# Patient Record
Sex: MALE | Race: BLACK OR AFRICAN AMERICAN | NOT HISPANIC OR LATINO | URBAN - METROPOLITAN AREA
[De-identification: names, ages, dates, MRNs, and addresses within clinical notes are randomized per-mention and may not be internally consistent; named-entity substitution may affect disease eponyms.]

---

## 2022-06-23 ENCOUNTER — INPATIENT (INPATIENT)
Facility: HOSPITAL | Age: 52
LOS: 6 days | Discharge: OTHER INSTITUTION | End: 2022-06-30
Attending: STUDENT IN AN ORGANIZED HEALTH CARE EDUCATION/TRAINING PROGRAM | Admitting: STUDENT IN AN ORGANIZED HEALTH CARE EDUCATION/TRAINING PROGRAM
Payer: MEDICAID

## 2022-06-23 VITALS
DIASTOLIC BLOOD PRESSURE: 103 MMHG | SYSTOLIC BLOOD PRESSURE: 164 MMHG | TEMPERATURE: 98 F | HEIGHT: 72 IN | OXYGEN SATURATION: 100 % | HEART RATE: 69 BPM | WEIGHT: 169.98 LBS | RESPIRATION RATE: 20 BRPM

## 2022-06-23 LAB
ALBUMIN SERPL ELPH-MCNC: 3.3 G/DL — SIGNIFICANT CHANGE UP (ref 3.3–5)
ALP SERPL-CCNC: 59 U/L — SIGNIFICANT CHANGE UP (ref 40–120)
ALT FLD-CCNC: 24 U/L — SIGNIFICANT CHANGE UP (ref 12–78)
AMPHET UR-MCNC: NEGATIVE — SIGNIFICANT CHANGE UP
ANION GAP SERPL CALC-SCNC: 1 MMOL/L — LOW (ref 5–17)
APAP SERPL-MCNC: < 2 UG/ML (ref 10–30)
APPEARANCE UR: CLEAR — SIGNIFICANT CHANGE UP
AST SERPL-CCNC: 24 U/L — SIGNIFICANT CHANGE UP (ref 15–37)
BARBITURATES UR SCN-MCNC: NEGATIVE — SIGNIFICANT CHANGE UP
BASE EXCESS BLDV CALC-SCNC: 4.3 MMOL/L — HIGH (ref -2–3)
BASOPHILS # BLD AUTO: 0.03 K/UL — SIGNIFICANT CHANGE UP (ref 0–0.2)
BASOPHILS NFR BLD AUTO: 0.6 % — SIGNIFICANT CHANGE UP (ref 0–2)
BENZODIAZ UR-MCNC: NEGATIVE — SIGNIFICANT CHANGE UP
BILIRUB SERPL-MCNC: 0.3 MG/DL — SIGNIFICANT CHANGE UP (ref 0.2–1.2)
BILIRUB UR-MCNC: NEGATIVE — SIGNIFICANT CHANGE UP
BLOOD GAS COMMENTS, VENOUS: SIGNIFICANT CHANGE UP
BUN SERPL-MCNC: 18 MG/DL — SIGNIFICANT CHANGE UP (ref 7–23)
CALCIUM SERPL-MCNC: 9.2 MG/DL — SIGNIFICANT CHANGE UP (ref 8.5–10.1)
CHLORIDE SERPL-SCNC: 114 MMOL/L — HIGH (ref 96–108)
CO2 BLDV-SCNC: 34 MMOL/L — HIGH (ref 22–26)
CO2 SERPL-SCNC: 31 MMOL/L — SIGNIFICANT CHANGE UP (ref 22–31)
COCAINE METAB.OTHER UR-MCNC: NEGATIVE — SIGNIFICANT CHANGE UP
COLOR SPEC: YELLOW — SIGNIFICANT CHANGE UP
CREAT SERPL-MCNC: 1.27 MG/DL — SIGNIFICANT CHANGE UP (ref 0.5–1.3)
DIFF PNL FLD: NEGATIVE — SIGNIFICANT CHANGE UP
EGFR: 68 ML/MIN/1.73M2 — SIGNIFICANT CHANGE UP
EOSINOPHIL # BLD AUTO: 0.33 K/UL — SIGNIFICANT CHANGE UP (ref 0–0.5)
EOSINOPHIL NFR BLD AUTO: 6.4 % — HIGH (ref 0–6)
ETHANOL SERPL-MCNC: <10 MG/DL — SIGNIFICANT CHANGE UP (ref 0–10)
FLUAV AG NPH QL: SIGNIFICANT CHANGE UP
FLUBV AG NPH QL: SIGNIFICANT CHANGE UP
GAS PNL BLDV: SIGNIFICANT CHANGE UP
GLUCOSE SERPL-MCNC: 131 MG/DL — HIGH (ref 70–99)
GLUCOSE UR QL: NEGATIVE MG/DL — SIGNIFICANT CHANGE UP
HCO3 BLDV-SCNC: 32 MMOL/L — HIGH (ref 22–28)
HCT VFR BLD CALC: 37.4 % — LOW (ref 39–50)
HGB BLD-MCNC: 12.3 G/DL — LOW (ref 13–17)
HOROWITZ INDEX BLDV+IHG-RTO: 21 — SIGNIFICANT CHANGE UP
IMM GRANULOCYTES NFR BLD AUTO: 0.4 % — SIGNIFICANT CHANGE UP (ref 0–1.5)
KETONES UR-MCNC: NEGATIVE — SIGNIFICANT CHANGE UP
LACTATE SERPL-SCNC: 0.9 MMOL/L — SIGNIFICANT CHANGE UP (ref 0.7–2)
LEUKOCYTE ESTERASE UR-ACNC: NEGATIVE — SIGNIFICANT CHANGE UP
LYMPHOCYTES # BLD AUTO: 1.61 K/UL — SIGNIFICANT CHANGE UP (ref 1–3.3)
LYMPHOCYTES # BLD AUTO: 31.2 % — SIGNIFICANT CHANGE UP (ref 13–44)
MAGNESIUM SERPL-MCNC: 2.4 MG/DL — SIGNIFICANT CHANGE UP (ref 1.6–2.6)
MCHC RBC-ENTMCNC: 26 PG — LOW (ref 27–34)
MCHC RBC-ENTMCNC: 32.9 G/DL — SIGNIFICANT CHANGE UP (ref 32–36)
MCV RBC AUTO: 79.1 FL — LOW (ref 80–100)
METHADONE UR-MCNC: NEGATIVE — SIGNIFICANT CHANGE UP
MONOCYTES # BLD AUTO: 0.37 K/UL — SIGNIFICANT CHANGE UP (ref 0–0.9)
MONOCYTES NFR BLD AUTO: 7.2 % — SIGNIFICANT CHANGE UP (ref 2–14)
NEUTROPHILS # BLD AUTO: 2.8 K/UL — SIGNIFICANT CHANGE UP (ref 1.8–7.4)
NEUTROPHILS NFR BLD AUTO: 54.2 % — SIGNIFICANT CHANGE UP (ref 43–77)
NITRITE UR-MCNC: NEGATIVE — SIGNIFICANT CHANGE UP
NRBC # BLD: 0 /100 WBCS — SIGNIFICANT CHANGE UP (ref 0–0)
OPIATES UR-MCNC: NEGATIVE — SIGNIFICANT CHANGE UP
PCO2 BLDV: 59 MMHG — HIGH (ref 42–55)
PCP SPEC-MCNC: SIGNIFICANT CHANGE UP
PCP UR-MCNC: NEGATIVE — SIGNIFICANT CHANGE UP
PH BLDV: 7.34 — SIGNIFICANT CHANGE UP (ref 7.32–7.43)
PH UR: 7 — SIGNIFICANT CHANGE UP (ref 5–8)
PLATELET # BLD AUTO: 179 K/UL — SIGNIFICANT CHANGE UP (ref 150–400)
PO2 BLDV: 29 MMHG — SIGNIFICANT CHANGE UP (ref 25–45)
POTASSIUM SERPL-MCNC: 3.9 MMOL/L — SIGNIFICANT CHANGE UP (ref 3.5–5.3)
POTASSIUM SERPL-SCNC: 3.9 MMOL/L — SIGNIFICANT CHANGE UP (ref 3.5–5.3)
PROT SERPL-MCNC: 6.9 GM/DL — SIGNIFICANT CHANGE UP (ref 6–8.3)
PROT UR-MCNC: NEGATIVE MG/DL — SIGNIFICANT CHANGE UP
RBC # BLD: 4.73 M/UL — SIGNIFICANT CHANGE UP (ref 4.2–5.8)
RBC # FLD: 13.2 % — SIGNIFICANT CHANGE UP (ref 10.3–14.5)
SALICYLATES SERPL-MCNC: <1.7 MG/DL — LOW (ref 2.8–20)
SAO2 % BLDV: 43.2 % — LOW (ref 94–98)
SARS-COV-2 RNA SPEC QL NAA+PROBE: SIGNIFICANT CHANGE UP
SODIUM SERPL-SCNC: 146 MMOL/L — HIGH (ref 135–145)
SP GR SPEC: 1.01 — SIGNIFICANT CHANGE UP (ref 1.01–1.02)
THC UR QL: NEGATIVE — SIGNIFICANT CHANGE UP
TROPONIN I, HIGH SENSITIVITY RESULT: 13.9 NG/L — SIGNIFICANT CHANGE UP
UROBILINOGEN FLD QL: NEGATIVE MG/DL — SIGNIFICANT CHANGE UP
WBC # BLD: 5.16 K/UL — SIGNIFICANT CHANGE UP (ref 3.8–10.5)
WBC # FLD AUTO: 5.16 K/UL — SIGNIFICANT CHANGE UP (ref 3.8–10.5)

## 2022-06-23 PROCEDURE — 71045 X-RAY EXAM CHEST 1 VIEW: CPT | Mod: 26

## 2022-06-23 PROCEDURE — 99222 1ST HOSP IP/OBS MODERATE 55: CPT

## 2022-06-23 PROCEDURE — 99285 EMERGENCY DEPT VISIT HI MDM: CPT

## 2022-06-23 PROCEDURE — 70450 CT HEAD/BRAIN W/O DYE: CPT | Mod: 26,MA

## 2022-06-23 PROCEDURE — 93010 ELECTROCARDIOGRAM REPORT: CPT

## 2022-06-23 RX ORDER — ENOXAPARIN SODIUM 100 MG/ML
90 INJECTION SUBCUTANEOUS EVERY 12 HOURS
Refills: 0 | Status: DISCONTINUED | OUTPATIENT
Start: 2022-06-23 | End: 2022-06-24

## 2022-06-23 RX ORDER — ENOXAPARIN SODIUM 100 MG/ML
80 INJECTION SUBCUTANEOUS EVERY 12 HOURS
Refills: 0 | Status: DISCONTINUED | OUTPATIENT
Start: 2022-06-23 | End: 2022-06-23

## 2022-06-23 RX ORDER — ENOXAPARIN SODIUM 100 MG/ML
80 INJECTION SUBCUTANEOUS ONCE
Refills: 0 | Status: DISCONTINUED | OUTPATIENT
Start: 2022-06-23 | End: 2022-06-23

## 2022-06-23 RX ORDER — PERMETHRIN CREAM 5% W/W 50 MG/G
1 CREAM TOPICAL ONCE
Refills: 0 | Status: COMPLETED | OUTPATIENT
Start: 2022-06-23 | End: 2022-06-23

## 2022-06-23 RX ADMIN — PERMETHRIN CREAM 5% W/W 1 APPLICATION(S): 50 CREAM TOPICAL at 14:45

## 2022-06-23 RX ADMIN — ENOXAPARIN SODIUM 90 MILLIGRAM(S): 100 INJECTION SUBCUTANEOUS at 22:17

## 2022-06-23 NOTE — ED ADULT NURSE NOTE - ED STAT RN HANDOFF DETAILS
Report given to Morena HENRY on 1B. Breathing unlabored on RA. patient resting comfortably in stretcher.No acute or respiratory distress noted. VS recorded in flowsheet. Endorsed all concerns to RN. IV site intact. Patient in stable condition at this time.

## 2022-06-23 NOTE — PATIENT PROFILE ADULT - FALL HARM RISK - HARM RISK INTERVENTIONS
Assistance with ambulation/Assistance OOB with selected safe patient handling equipment/Communicate Risk of Fall with Harm to all staff/Discuss with provider need for PT consult/Monitor for mental status changes/Monitor gait and stability/Move patient closer to nurses' station/Provide patient with walking aids - walker, cane, crutches/Reinforce activity limits and safety measures with patient and family/Reorient to person, place and time as needed/Tailored Fall Risk Interventions/Toileting schedule using arm’s reach rule for commode and bathroom/Use of alarms - bed, chair and/or voice tab/Visual Cue: Yellow wristband and red socks/Bed in lowest position, wheels locked, appropriate side rails in place/Call bell, personal items and telephone in reach/Instruct patient to call for assistance before getting out of bed or chair/Non-slip footwear when patient is out of bed/[place] to call system/Physically safe environment - no spills, clutter or unnecessary equipment/Purposeful Proactive Rounding/Room/bathroom lighting operational, light cord in reach

## 2022-06-23 NOTE — ED ADULT TRIAGE NOTE - DOMESTIC TRAVEL HIGH RISK QUESTION
----- Message from Constance Duran sent at 10/29/2021  4:37 PM EDT -----  Subject: Refill Request    QUESTIONS  Name of Medication? fluticasone (FLONASE) 50 MCG/ACT nasal spray  Patient-reported dosage and instructions? I spray each nostril everynight   at bedtime  How many days do you have left? 1  Preferred Pharmacy? [id number] phone number (if available)? [phone]  ---------------------------------------------------------------------------  --------------,  Name of Medication? albuterol (PROVENTIL) (2.5 MG/3ML) 0.083% nebulizer   solution  Patient-reported dosage and instructions? 1 puff with nebulizer 4 times a   day  How many days do you have left? 2  Preferred Pharmacy? [id number] phone number (if available)? [phone]  ---------------------------------------------------------------------------  --------------  CALL BACK INFO  What is the best way for the office to contact you? OK to leave message on   voicemail  Preferred Call Back Phone Number?  [phone] No

## 2022-06-23 NOTE — ED PROVIDER NOTE - WR ORDER ID 1

## 2022-06-23 NOTE — PHARMACOTHERAPY INTERVENTION NOTE - COMMENTS
Initially pt weight was 77kg; however; RN just informed that correct weight is ~94kg; recommended dose of Lovenox is 1mg/kg q12h. Spoke to the house PA and they will adjust the dose according to the new/correct weight. Spoke to Clay Jacinto - there is no DVT; however per Dr. Thao patient should be on therapeutic lovenox until CT is done which is to be done tomorrow morning. Will order 90mg q12h based on the new weight and team will make changes in the morning if negative for DVT  
Initially pt weight was 77kg; however; RN just informed that correct weight is ~94kg; recommended dose of Lovenox is 1mg/kg q12h. Spoke to the house PA and they will adjust the dose according to the new/correct weight

## 2022-06-23 NOTE — ED ADULT NURSE NOTE - OBJECTIVE STATEMENT
As per triage, patient found lying on the sidewalk in [place] sleeping. PMH HTN. Patient appears sleepy. Upon assessment, multiple bugs found on patient's skin. All clothes removed and put in a separate bag. Patient changed into hospital gown. Contact precautions sign placed on door. Attempted to awake patient, patient alert to name, , and place. Patient falling asleep while talking. States "the people that work with the hospital brought me here". denies any pain or discomfort. denies cp, SOB, difficulty breathing, abdominal pain. denies any other symptoms. denies PMH. patient unable to provide history, poor historian. placed on cardiac monitor, labs drawn. As per triage, patient found lying on the sidewalk in [place] sleeping. PMH HTN. Patient appears sleepy. right swollen leg, Upon assessment, multiple bugs found on patient's skin. All clothes removed and put in a separate bag. Patient changed into hospital gown. Contact precautions sign placed on door. Attempted to awake patient, patient alert to name, , and place. Patient falling asleep while talking. States "the people that work with the hospital brought me here". denies any pain or discomfort. denies cp, SOB, difficulty breathing, abdominal pain. denies any other symptoms. denies PMH. patient unable to provide history, poor historian. placed on cardiac monitor, labs drawn.

## 2022-06-23 NOTE — ED PROVIDER NOTE - CLINICAL SUMMARY MEDICAL DECISION MAKING FREE TEXT BOX
patient lethargic, no focal neurologic abnormalities, malodorous with poor hygiene. evaluated for causes of AMS with negative ED workup, DVT study of lower extremity ordered, unable to obtain at this time due to presence of bed bugs on patinet. will empirically give lovenox, DVT study can be followed as inpatient. no clinical signs of infection

## 2022-06-23 NOTE — ED PROVIDER NOTE - OBJECTIVE STATEMENT
51m unknown pmhx. found sleeping outside on the street, patient arousable but poor historian. denies pain any where, doesn't answer questions well. admits to feeling weak.

## 2022-06-23 NOTE — H&P ADULT - HISTORY OF PRESENT ILLNESS
51 year old male only PMH of unknown PMH was found sleeping outside on the street. Patient arousable but poor historian. Denies pain any where, doesn't answer questions well. Only admits to feeling weak. Initial labs in ER all WNL.  51 year old male only PMH of unknown PMH was found sleeping outside on the street. Patient arousable but poor historian. Denies pain any where, doesn't answer questions well. Only admits to feeling weak. Initial labs in ER all WNL. Walks independently in ER.

## 2022-06-23 NOTE — ED ADULT NURSE REASSESSMENT NOTE - NS ED NURSE REASSESS COMMENT FT1
Breathing unlabored on RA. No acute or respiratory distress noted. patient provided with meal tray. patient taken to 1B. left the unit in a stable condition. As per ROBIN Scales, patient belongings to be thrown away and SW will provide patient with resources for clothing.

## 2022-06-23 NOTE — ED ADULT NURSE REASSESSMENT NOTE - NS ED NURSE REASSESS COMMENT FT1
Patient in CT scan at this time. Patient left the unit in a stable condition. No acute or respiratory distress.

## 2022-06-23 NOTE — ED ADULT NURSE REASSESSMENT NOTE - NS ED NURSE REASSESS COMMENT FT1
Patient washed with soap and saline. Patient placed in a new gown. Premethrin topical cream applied.

## 2022-06-23 NOTE — H&P ADULT - NSHPPHYSICALEXAM_GEN_ALL_CORE
PHYSICAL EXAMINATION:  Vital Signs Last 24 Hrs  T(C): 36.9 (23 Jun 2022 12:24), Max: 36.9 (23 Jun 2022 12:24)  T(F): 98.5 (23 Jun 2022 12:24), Max: 98.5 (23 Jun 2022 12:24)  HR: 65 (23 Jun 2022 12:24) (65 - 70)  BP: 144/85 (23 Jun 2022 12:24) (144/85 - 180/98)  BP(mean): --  RR: 20 (23 Jun 2022 12:24) (14 - 20)  SpO2: 97% (23 Jun 2022 12:24) (97% - 100%)  CAPILLARY BLOOD GLUCOSE          GENERAL: NAD,   ENMT: mucous membranes moist  NECK: supple, No JVD  CHEST/LUNG: clear to auscultation bilaterally; no rales, rhonchi, or wheezing b/l  HEART: normal S1, S2  ABDOMEN: BS+, soft, ND, NT   EXTREMITIES:  pulses palpable; no clubbing, cyanosis, or edema b/l LEs  NEURO: awake, alert, interactive; moves all extremities PHYSICAL EXAMINATION:  Vital Signs Last 24 Hrs  T(C): 36.9 (23 Jun 2022 12:24), Max: 36.9 (23 Jun 2022 12:24)  T(F): 98.5 (23 Jun 2022 12:24), Max: 98.5 (23 Jun 2022 12:24)  HR: 65 (23 Jun 2022 12:24) (65 - 70)  BP: 144/85 (23 Jun 2022 12:24) (144/85 - 180/98)  BP(mean): --  RR: 20 (23 Jun 2022 12:24) (14 - 20)  SpO2: 97% (23 Jun 2022 12:24) (97% - 100%)  CAPILLARY BLOOD GLUCOSE          GENERAL: NAD, seen in ER,comfortable, no fevers or SOB, mild right leg edema.   ENMT: mucous membranes moist  NECK: supple, No JVD  CHEST/LUNG: clear to auscultation bilaterally; no rales, rhonchi, or wheezing b/l  HEART: normal S1, S2  ABDOMEN: BS+, soft, ND, NT   EXTREMITIES:  pulses palpable; no clubbing, cyanosis, 1 plus edema right leg.   NEURO: awake, alert, interactive; moves all extremities

## 2022-06-23 NOTE — ED PROVIDER NOTE - PHYSICAL EXAMINATION
General: arousable, nontoxic appearing  Skin: Skin in warm, dry  HENMT: head normocephalic and atraumatic; bilateral external ears without swelling. no nasal discharge. moist oral mucosa. supple neck, trachea midline  EYES: Conjunctiva clear. nonicteric sclera. EOM intact, Eyelids are normal in appearance without swelling or lesions.  Cardiac: well perfused, s1, s2, rrr  Respiratory: breathing comfortably on room air. no audible wheezing or stridor  Abdominal: nondistended, soft, nontender  MSK: Neck and back are without deformity, visible external skin changes, or signs of trauma. Curvature of the cervical, thoracic, and lumbar spine are within normal limits. no external signs of trauma. no apparent deficits in ROM of any extremity. swelling to RLE with scabbed lesions  Neurological: patient is lethargic but arousable to verbal stimulation, following commands, no obvious defecits, moving all 4 extremities

## 2022-06-23 NOTE — H&P ADULT - NSHPLABSRESULTS_GEN_ALL_CORE
LABS:                        12.3   5.16  )-----------( 179      ( 2022 12:10 )             37.4         146<H>  |  114<H>  |  18  ----------------------------<  131<H>  3.9   |  31  |  1.27    Ca    9.2      2022 12:10  Mg     2.4         TPro  6.9  /  Alb  3.3  /  TBili  0.3  /  DBili  x   /  AST  24  /  ALT  24  /  AlkPhos  59        Urinalysis Basic - ( 2022 17:05 )    Color: Yellow / Appearance: Clear / S.010 / pH: x  Gluc: x / Ketone: Negative  / Bili: Negative / Urobili: Negative mg/dL   Blood: x / Protein: Negative mg/dL / Nitrite: Negative   Leuk Esterase: Negative / RBC: x / WBC x   Sq Epi: x / Non Sq Epi: x / Bacteria: x          RADIOLOGY & ADDITIONAL TESTS:

## 2022-06-23 NOTE — ED ADULT NURSE NOTE - CHIEF COMPLAINT QUOTE
General weakness, right swollen leg, found lying on the sidewalk in [place] sleeping  H/O HTN

## 2022-06-23 NOTE — H&P ADULT - ASSESSMENT
51 year old male only PMH of unknown PMH was found sleeping outside on the street. Patient arousable but poor historian. Denies pain any where, doesn't answer questions well. Only admits to feeling weak. Initial labs in ER all WNL.      Plan:   51 year old male only PMH of unknown PMH was found sleeping outside on the street. Patient arousable but poor historian. Denies pain any where, doesn't answer questions well. Only admits to feeling weak. Initial labs in ER all WNL.        Plan:  Admit to medicine. Social work consult requested. Reports no PMH or regular meds. Bed bugs noted in ER. Was washed and bathed and bed bugs   were treated in ER twice.  Will do LE sonogram in AM r/o right leg DVT. On Lovenox until test is done. No CP or SOB.   No DVT risk factors.    51 year old male only PMH of unknown PMH was found sleeping outside on the street. Patient arousable but poor historian. Denies pain any where, doesn't answer questions well. Only admits to feeling weak. Initial labs in ER all WNL.        Plan:  Admit to medicine. Social work consult requested. Reports no PMH or regular meds. Bed bugs noted in ER. Was washed and bathed and bed bugs   were treated in ER twice.  Will do LE sonogram in AM r/o right leg DVT. On Lovenox until test is done. No CP or SOB.   No DVT risk factors.    UA negative for infection, CXR was clear, CT head no acute findings.  Urine tox negative.

## 2022-06-24 LAB
CULTURE RESULTS: SIGNIFICANT CHANGE UP
HCT VFR BLD CALC: 36.7 % — LOW (ref 39–50)
HGB BLD-MCNC: 12 G/DL — LOW (ref 13–17)
MCHC RBC-ENTMCNC: 25.8 PG — LOW (ref 27–34)
MCHC RBC-ENTMCNC: 32.7 G/DL — SIGNIFICANT CHANGE UP (ref 32–36)
MCV RBC AUTO: 78.8 FL — LOW (ref 80–100)
NRBC # BLD: 0 /100 WBCS — SIGNIFICANT CHANGE UP (ref 0–0)
PLATELET # BLD AUTO: 179 K/UL — SIGNIFICANT CHANGE UP (ref 150–400)
RBC # BLD: 4.66 M/UL — SIGNIFICANT CHANGE UP (ref 4.2–5.8)
RBC # FLD: 13.3 % — SIGNIFICANT CHANGE UP (ref 10.3–14.5)
SPECIMEN SOURCE: SIGNIFICANT CHANGE UP
WBC # BLD: 4.52 K/UL — SIGNIFICANT CHANGE UP (ref 3.8–10.5)
WBC # FLD AUTO: 4.52 K/UL — SIGNIFICANT CHANGE UP (ref 3.8–10.5)

## 2022-06-24 PROCEDURE — 99231 SBSQ HOSP IP/OBS SF/LOW 25: CPT

## 2022-06-24 PROCEDURE — 93971 EXTREMITY STUDY: CPT | Mod: 26,RT

## 2022-06-24 RX ORDER — ENOXAPARIN SODIUM 100 MG/ML
40 INJECTION SUBCUTANEOUS EVERY 24 HOURS
Refills: 0 | Status: DISCONTINUED | OUTPATIENT
Start: 2022-06-25 | End: 2022-06-30

## 2022-06-24 RX ADMIN — ENOXAPARIN SODIUM 90 MILLIGRAM(S): 100 INJECTION SUBCUTANEOUS at 05:06

## 2022-06-24 NOTE — PROGRESS NOTE ADULT - ASSESSMENT
51 year old male only PMH of unknown PMH was found sleeping outside on the street. Patient arousable but poor historian. Denies pain any where, doesn't answer questions well. Only admits to feeling weak. Initial labs in ER all WNL.        Placement issue:  Social work consult requested. Reports no PMH or regular meds. Bed bugs noted in ER. Was washed and bathed and bed bugs   were treated in ER twice.    DVT ruled out, AC switched to ppx dose.    UA negative for infection, CXR was clear, CT head no acute findings.  Urine tox negative.

## 2022-06-25 LAB
ANION GAP SERPL CALC-SCNC: 5 MMOL/L — SIGNIFICANT CHANGE UP (ref 5–17)
BUN SERPL-MCNC: 13 MG/DL — SIGNIFICANT CHANGE UP (ref 7–23)
CALCIUM SERPL-MCNC: 8.6 MG/DL — SIGNIFICANT CHANGE UP (ref 8.5–10.1)
CHLORIDE SERPL-SCNC: 109 MMOL/L — HIGH (ref 96–108)
CO2 SERPL-SCNC: 30 MMOL/L — SIGNIFICANT CHANGE UP (ref 22–31)
CREAT SERPL-MCNC: 1.28 MG/DL — SIGNIFICANT CHANGE UP (ref 0.5–1.3)
EGFR: 67 ML/MIN/1.73M2 — SIGNIFICANT CHANGE UP
GLUCOSE SERPL-MCNC: 117 MG/DL — HIGH (ref 70–99)
HCT VFR BLD CALC: 36.6 % — LOW (ref 39–50)
HGB BLD-MCNC: 12.2 G/DL — LOW (ref 13–17)
MCHC RBC-ENTMCNC: 26.2 PG — LOW (ref 27–34)
MCHC RBC-ENTMCNC: 33.3 G/DL — SIGNIFICANT CHANGE UP (ref 32–36)
MCV RBC AUTO: 78.7 FL — LOW (ref 80–100)
NRBC # BLD: 0 /100 WBCS — SIGNIFICANT CHANGE UP (ref 0–0)
PLATELET # BLD AUTO: 172 K/UL — SIGNIFICANT CHANGE UP (ref 150–400)
POTASSIUM SERPL-MCNC: 3.8 MMOL/L — SIGNIFICANT CHANGE UP (ref 3.5–5.3)
POTASSIUM SERPL-SCNC: 3.8 MMOL/L — SIGNIFICANT CHANGE UP (ref 3.5–5.3)
RBC # BLD: 4.65 M/UL — SIGNIFICANT CHANGE UP (ref 4.2–5.8)
RBC # FLD: 13.3 % — SIGNIFICANT CHANGE UP (ref 10.3–14.5)
SODIUM SERPL-SCNC: 144 MMOL/L — SIGNIFICANT CHANGE UP (ref 135–145)
WBC # BLD: 4.01 K/UL — SIGNIFICANT CHANGE UP (ref 3.8–10.5)
WBC # FLD AUTO: 4.01 K/UL — SIGNIFICANT CHANGE UP (ref 3.8–10.5)

## 2022-06-25 PROCEDURE — 99232 SBSQ HOSP IP/OBS MODERATE 35: CPT

## 2022-06-25 RX ORDER — AMLODIPINE BESYLATE 2.5 MG/1
5 TABLET ORAL DAILY
Refills: 0 | Status: DISCONTINUED | OUTPATIENT
Start: 2022-06-25 | End: 2022-06-25

## 2022-06-25 RX ORDER — HYDRALAZINE HCL 50 MG
10 TABLET ORAL ONCE
Refills: 0 | Status: COMPLETED | OUTPATIENT
Start: 2022-06-25 | End: 2022-06-25

## 2022-06-25 RX ORDER — AMLODIPINE BESYLATE 2.5 MG/1
10 TABLET ORAL DAILY
Refills: 0 | Status: DISCONTINUED | OUTPATIENT
Start: 2022-06-25 | End: 2022-06-30

## 2022-06-25 RX ADMIN — ENOXAPARIN SODIUM 40 MILLIGRAM(S): 100 INJECTION SUBCUTANEOUS at 05:11

## 2022-06-25 RX ADMIN — Medication 10 MILLIGRAM(S): at 18:51

## 2022-06-25 RX ADMIN — AMLODIPINE BESYLATE 5 MILLIGRAM(S): 2.5 TABLET ORAL at 14:29

## 2022-06-25 NOTE — DIETITIAN INITIAL EVALUATION ADULT - OTHER INFO
Pt undomiciled, found on street; admitted with AMS. Pt independent for ADLs.  Pt appears confused; unable to answer questions effectively; unable to obtain information regarding pt's diet & wt hx, po intake PTA, etc.  Pt keeps repeating that he knows how to cook and prepares his own foods, however could not provide information about what foods/meals he cooks, where he prepares/cooks meals or information about purchasing/accessing foods. Pending SW and CM consults. Plan is for shelter placement.  Pt with good appetite & po intake during admission, >75% of meals consumed; no reports of chewing/swallowing difficulty.

## 2022-06-25 NOTE — DIETITIAN INITIAL EVALUATION ADULT - PERTINENT MEDS FT
MEDICATIONS  (STANDING):  enoxaparin Injectable 40 milliGRAM(s) SubCutaneous every 24 hours    MEDICATIONS  (PRN):

## 2022-06-25 NOTE — DIETITIAN INITIAL EVALUATION ADULT - ETIOLOGY
Pt undomiciled; lack of financial resources to purchase foods, lack of participation in community supplemental food programs

## 2022-06-25 NOTE — DIETITIAN INITIAL EVALUATION ADULT - PERTINENT LABORATORY DATA
06-25    144  |  109<H>  |  13  ----------------------------<  117<H>  3.8   |  30  |  1.28    Ca    8.6      25 Jun 2022 10:45

## 2022-06-25 NOTE — DIETITIAN INITIAL EVALUATION ADULT - REASON
Pt politely refused nutrition focused physical exam; pt with confusion and unclear whether pt understood; pt with no visible signs of muscle wasting or fat depletion in visible regions (temporal, orbital, buccal regions).

## 2022-06-25 NOTE — PROGRESS NOTE ADULT - ASSESSMENT
51 year old male denies PMH presented after being found asleep, noted to have uncontrolled hypertension.    # Uncontrolled Hypertension - BP control improving.  Escalate Norvasc.   # ? Altered Mental Status - pt denies, states he was sleeping.  CT Head unremarkable.  Supportive care.  # Inpatient DVT Prophylaxis - Lovenox subcut      Stable for d/c pending SW followup.

## 2022-06-26 LAB
ANION GAP SERPL CALC-SCNC: 8 MMOL/L — SIGNIFICANT CHANGE UP (ref 5–17)
BUN SERPL-MCNC: 13 MG/DL — SIGNIFICANT CHANGE UP (ref 7–23)
CALCIUM SERPL-MCNC: 9.1 MG/DL — SIGNIFICANT CHANGE UP (ref 8.5–10.1)
CHLORIDE SERPL-SCNC: 112 MMOL/L — HIGH (ref 96–108)
CO2 SERPL-SCNC: 25 MMOL/L — SIGNIFICANT CHANGE UP (ref 22–31)
CREAT SERPL-MCNC: 1.11 MG/DL — SIGNIFICANT CHANGE UP (ref 0.5–1.3)
EGFR: 79 ML/MIN/1.73M2 — SIGNIFICANT CHANGE UP
GLUCOSE SERPL-MCNC: 91 MG/DL — SIGNIFICANT CHANGE UP (ref 70–99)
POTASSIUM SERPL-MCNC: 3.6 MMOL/L — SIGNIFICANT CHANGE UP (ref 3.5–5.3)
POTASSIUM SERPL-SCNC: 3.6 MMOL/L — SIGNIFICANT CHANGE UP (ref 3.5–5.3)
SODIUM SERPL-SCNC: 145 MMOL/L — SIGNIFICANT CHANGE UP (ref 135–145)

## 2022-06-26 PROCEDURE — 99232 SBSQ HOSP IP/OBS MODERATE 35: CPT

## 2022-06-26 RX ORDER — LISINOPRIL 2.5 MG/1
5 TABLET ORAL DAILY
Refills: 0 | Status: DISCONTINUED | OUTPATIENT
Start: 2022-06-26 | End: 2022-06-30

## 2022-06-26 RX ADMIN — ENOXAPARIN SODIUM 40 MILLIGRAM(S): 100 INJECTION SUBCUTANEOUS at 05:10

## 2022-06-26 RX ADMIN — AMLODIPINE BESYLATE 10 MILLIGRAM(S): 2.5 TABLET ORAL at 05:10

## 2022-06-26 RX ADMIN — LISINOPRIL 5 MILLIGRAM(S): 2.5 TABLET ORAL at 16:53

## 2022-06-26 NOTE — PROGRESS NOTE ADULT - ASSESSMENT
51 year old male denies PMH presented after being found asleep, noted to have uncontrolled hypertension, ? Altered mental status.    # Uncontrolled Hypertension - BP control improving.  Norvasc.  Add ACEI.    # Altered Mental Status - pt denies, states he was sleeping.  CT Head unremarkable.  Supportive care.  # Inpatient DVT Prophylaxis - Lovenox subcut      Stable for d/c pending SW followup.   51 year old male denies PMH presented after being found asleep, noted to have uncontrolled hypertension, ? Altered mental status.    # Uncontrolled Hypertension - BP control improving.  Norvasc.  Add ACEI.    # Altered Mental Status - pt denies, states he was sleeping.  CT Head unremarkable.  Supportive care.  # Inpatient DVT Prophylaxis - Lovenox subcut    Stable for d/c pending shelter placement.  I discussed with GENEVA

## 2022-06-27 PROCEDURE — 99232 SBSQ HOSP IP/OBS MODERATE 35: CPT

## 2022-06-27 RX ORDER — AMLODIPINE BESYLATE 2.5 MG/1
1 TABLET ORAL
Qty: 30 | Refills: 0
Start: 2022-06-27

## 2022-06-27 RX ORDER — LISINOPRIL 2.5 MG/1
1 TABLET ORAL
Qty: 30 | Refills: 0
Start: 2022-06-27

## 2022-06-27 RX ADMIN — LISINOPRIL 5 MILLIGRAM(S): 2.5 TABLET ORAL at 05:15

## 2022-06-27 RX ADMIN — ENOXAPARIN SODIUM 40 MILLIGRAM(S): 100 INJECTION SUBCUTANEOUS at 05:16

## 2022-06-27 RX ADMIN — AMLODIPINE BESYLATE 10 MILLIGRAM(S): 2.5 TABLET ORAL at 05:15

## 2022-06-27 NOTE — DISCHARGE NOTE PROVIDER - NSDCCPCAREPLAN_GEN_ALL_CORE_FT
PRINCIPAL DISCHARGE DIAGNOSIS  Diagnosis: AMS (altered mental status)  Assessment and Plan of Treatment:       SECONDARY DISCHARGE DIAGNOSES  Diagnosis: Essential hypertension  Assessment and Plan of Treatment:     Diagnosis: Homeless  Assessment and Plan of Treatment:      PRINCIPAL DISCHARGE DIAGNOSIS  Diagnosis: Metabolic encephalopathy  Assessment and Plan of Treatment:       SECONDARY DISCHARGE DIAGNOSES  Diagnosis: Essential hypertension  Assessment and Plan of Treatment:     Diagnosis: Homeless  Assessment and Plan of Treatment:     Diagnosis: Hypernatremia  Assessment and Plan of Treatment:

## 2022-06-27 NOTE — DISCHARGE NOTE PROVIDER - NSDCFUADDINST_GEN_ALL_CORE_FT
Ensure Enlive 1 can oral 3 times per day.     It is important to see your primary physician as well as any specialty physicians within the next week to perform a comprehensive medical review.  Call their offices for an appointment as soon as you leave the hospital.  You will also need to see them for renewal of your medications.  If have any difficulty following with a physician, contact the [facility] [phone] or via [email or web address].   To obtain your results, you can access the [email or web address] Patient Portal at [email or web address].  Your medical issues appear to be stable at this time, but if your symptoms recur or worsen, contact your physicians and/or return to the hospital if necessary.  If you encounter any issues or questions with your medication, call your physicians before stopping the medication.  Do not drive.  Limit your diet to 2 grams of sodium daily.

## 2022-06-27 NOTE — DISCHARGE NOTE PROVIDER - HOSPITAL COURSE
51 year old male denies PMH presented after being found asleep, noted to have uncontrolled hypertension, ? Altered mental status.    # Uncontrolled Hypertension - BP control improving.  Norvasc.  Continue ACEI.    # Altered Mental Status - pt denies, states he was sleeping.  CT Head unremarkable.  Supportive care.  # Inpatient DVT Prophylaxis - Lovenox subcut    Stable for d/c pending shelter placement.  I discussed with GENEVA     51 year old male denies PMH presented after being found confused, noted on admission to be hypernatremic with uncontrolled hypertension with bedbug infestation.  Bathed in ED, Na, BP have improved.  Pt awaiting shelter placement. 51 year old male denies PMH presented after being found confused, noted on admission to be hypernatremic with uncontrolled hypertension with bedbug infestation.  Bathed in ED, Na, BP have improved.  Pt awaiting shelter placement.  Recieved 3rd COVID vaccination. Patient medically stable for discharge.     # Uncontrolled Hypertension - BP control improving.  Norvasc.  Continue ACEI.   # Hypernatremia - resolved.  Pt taking po intake.   # Metabolic Encephalopathy - likely due to above.  Resolved.  CT Head unremarkable.  Supportive care.  # Inpatient DVT Prophylaxis - Lovenox subcut

## 2022-06-27 NOTE — PROGRESS NOTE ADULT - ASSESSMENT
51 year old male denies PMH presented after being found asleep, noted to have uncontrolled hypertension, ? Altered mental status.    # Uncontrolled Hypertension - BP control improving.  Norvasc.  Continue ACEI.    # Altered Mental Status - pt denies, states he was sleeping.  CT Head unremarkable.  Supportive care.  # Inpatient DVT Prophylaxis - Lovenox subcut    Stable for d/c pending shelter placement.  I discussed with GENEVA

## 2022-06-28 LAB
ANION GAP SERPL CALC-SCNC: 3 MMOL/L — LOW (ref 5–17)
BUN SERPL-MCNC: 16 MG/DL — SIGNIFICANT CHANGE UP (ref 7–23)
CALCIUM SERPL-MCNC: 8.8 MG/DL — SIGNIFICANT CHANGE UP (ref 8.5–10.1)
CHLORIDE SERPL-SCNC: 109 MMOL/L — HIGH (ref 96–108)
CO2 SERPL-SCNC: 30 MMOL/L — SIGNIFICANT CHANGE UP (ref 22–31)
CREAT SERPL-MCNC: 1.13 MG/DL — SIGNIFICANT CHANGE UP (ref 0.5–1.3)
EGFR: 78 ML/MIN/1.73M2 — SIGNIFICANT CHANGE UP
GLUCOSE SERPL-MCNC: 87 MG/DL — SIGNIFICANT CHANGE UP (ref 70–99)
HCT VFR BLD CALC: 37.2 % — LOW (ref 39–50)
HGB BLD-MCNC: 12.3 G/DL — LOW (ref 13–17)
MCHC RBC-ENTMCNC: 25.8 PG — LOW (ref 27–34)
MCHC RBC-ENTMCNC: 33.1 G/DL — SIGNIFICANT CHANGE UP (ref 32–36)
MCV RBC AUTO: 78.2 FL — LOW (ref 80–100)
NRBC # BLD: 0 /100 WBCS — SIGNIFICANT CHANGE UP (ref 0–0)
PLATELET # BLD AUTO: 183 K/UL — SIGNIFICANT CHANGE UP (ref 150–400)
POTASSIUM SERPL-MCNC: 3.7 MMOL/L — SIGNIFICANT CHANGE UP (ref 3.5–5.3)
POTASSIUM SERPL-SCNC: 3.7 MMOL/L — SIGNIFICANT CHANGE UP (ref 3.5–5.3)
RAPID RVP RESULT: SIGNIFICANT CHANGE UP
RBC # BLD: 4.76 M/UL — SIGNIFICANT CHANGE UP (ref 4.2–5.8)
RBC # FLD: 13.2 % — SIGNIFICANT CHANGE UP (ref 10.3–14.5)
SARS-COV-2 RNA SPEC QL NAA+PROBE: SIGNIFICANT CHANGE UP
SODIUM SERPL-SCNC: 142 MMOL/L — SIGNIFICANT CHANGE UP (ref 135–145)
WBC # BLD: 4.44 K/UL — SIGNIFICANT CHANGE UP (ref 3.8–10.5)
WBC # FLD AUTO: 4.44 K/UL — SIGNIFICANT CHANGE UP (ref 3.8–10.5)

## 2022-06-28 PROCEDURE — 99232 SBSQ HOSP IP/OBS MODERATE 35: CPT

## 2022-06-28 RX ADMIN — LISINOPRIL 5 MILLIGRAM(S): 2.5 TABLET ORAL at 05:43

## 2022-06-28 RX ADMIN — ENOXAPARIN SODIUM 40 MILLIGRAM(S): 100 INJECTION SUBCUTANEOUS at 05:43

## 2022-06-28 RX ADMIN — AMLODIPINE BESYLATE 10 MILLIGRAM(S): 2.5 TABLET ORAL at 05:43

## 2022-06-28 NOTE — PROGRESS NOTE ADULT - ASSESSMENT
51 year old male denies PMH presented after being found confused, noted on admission to be hypernatremic with uncontrolled hypertension with bedbug infestation.  Bathed in ED, Na, BP have improved.  Pt awaiting shelter placement.  Requesting 3rd COVID vaccination.    # Uncontrolled Hypertension - BP control improving.  Norvasc.  Continue ACEI.    # Hypernatremia - resolved.  Pt taking po intake.   # Metabolic Encephalopathy - likely due to above.  Resolved.  CT Head unremarkable.  Supportive care.  # Inpatient DVT Prophylaxis - Lovenox subcut    Stable for d/c pending shelter placement.  I discussed with GENEVA

## 2022-06-28 NOTE — CHART NOTE - NSCHARTNOTEFT_GEN_A_CORE
Medicine Hospitalist PA    Covid Screening Consent form discussed with patient. Patient denies any covid infection. Pt is vaccinated x 2 doses. Pt agrees to Pfizer vaccine booster inhouse prior to discharge. Pfizer booster to be ordered and covid screen form signed by pt and placed in chart.

## 2022-06-29 PROCEDURE — 99232 SBSQ HOSP IP/OBS MODERATE 35: CPT

## 2022-06-29 RX ADMIN — LISINOPRIL 5 MILLIGRAM(S): 2.5 TABLET ORAL at 05:50

## 2022-06-29 RX ADMIN — ENOXAPARIN SODIUM 40 MILLIGRAM(S): 100 INJECTION SUBCUTANEOUS at 05:50

## 2022-06-29 RX ADMIN — AMLODIPINE BESYLATE 10 MILLIGRAM(S): 2.5 TABLET ORAL at 05:50

## 2022-06-29 NOTE — PROGRESS NOTE ADULT - ASSESSMENT
51 year old male denies PMH presented after being found confused, noted on admission to be hypernatremic with uncontrolled hypertension with bedbug infestation.  Bathed in ED, Na, BP have improved.  Pt awaiting shelter placement.  Recieved 3rd COVID vaccination.    # Uncontrolled Hypertension - BP control improving.  Norvasc.  Continue ACEI.   # Hypernatremia - resolved.  Pt taking po intake.   # Metabolic Encephalopathy - likely due to above.  Resolved.  CT Head unremarkable.  Supportive care.  # Inpatient DVT Prophylaxis - Lovenox subcut

## 2022-06-29 NOTE — PROGRESS NOTE ADULT - REASON FOR ADMISSION
Homeless, found on street.

## 2022-06-29 NOTE — PROGRESS NOTE ADULT - SUBJECTIVE AND OBJECTIVE BOX
Patient: EMIR KHAN [id number] 51y Male                            Hospitalist Attending Note    No complaints.   States he was vaccinated against COVID > 6 months ago, never received booster.      ____________________PHYSICAL EXAM:  GENERAL:  NAD Alert and Oriented x 3   HEENT: NCAT  CARDIOVASCULAR:  S1, S2  LUNGS: CTAB  ABDOMEN:  soft, (-) tenderness, (-) distension, (+) bowel sounds, (-) guarding, (-) rebound (-) rigidity  EXTREMITIES:  no cyanosis / clubbing / edema.   ____________________    VITALS:  Vital Signs Last 24 Hrs  T(C): 36.6 (28 Jun 2022 10:45), Max: 36.9 (27 Jun 2022 11:41)  T(F): 97.9 (28 Jun 2022 10:45), Max: 98.5 (27 Jun 2022 11:41)  HR: 67 (28 Jun 2022 10:45) (55 - 90)  BP: 148/82 (28 Jun 2022 10:45) (130/77 - 157/91)  BP(mean): --  RR: 18 (28 Jun 2022 10:45) (18 - 20)  SpO2: 100% (28 Jun 2022 10:45) (94% - 100%) Daily     Daily   CAPILLARY BLOOD GLUCOSE        I&O's Summary    27 Jun 2022 07:01  -  28 Jun 2022 07:00  --------------------------------------------------------  IN: 0 mL / OUT: 200 mL / NET: -200 mL    28 Jun 2022 07:01  -  28 Jun 2022 10:56  --------------------------------------------------------  IN: 380 mL / OUT: 0 mL / NET: 380 mL        LABS:                        12.3   4.44  )-----------( 183      ( 28 Jun 2022 06:49 )             37.2     06-28    142  |  109<H>  |  16  ----------------------------<  87  3.7   |  30  |  1.13    Ca    8.8      28 Jun 2022 06:49                    MEDICATIONS:  amLODIPine   Tablet 10 milliGRAM(s) Oral daily  enoxaparin Injectable 40 milliGRAM(s) SubCutaneous every 24 hours  lisinopril 5 milliGRAM(s) Oral daily    
                          Patient: EMIR KHAN [id number] 51y Male                            Hospitalist Attending Note    No complaints.     ____________________PHYSICAL EXAM:  GENERAL:  NAD Alert and Oriented x 3   HEENT: NCAT  CARDIOVASCULAR:  S1, S2  LUNGS: CTAB  ABDOMEN:  soft, (-) tenderness, (-) distension, (+) bowel sounds, (-) guarding, (-) rebound (-) rigidity  EXTREMITIES:  no cyanosis / clubbing / edema.   ____________________       VITALS:  Vital Signs Last 24 Hrs  T(C): 36.8 (25 Jun 2022 17:26), Max: 36.8 (25 Jun 2022 17:26)  T(F): 98.2 (25 Jun 2022 17:26), Max: 98.2 (25 Jun 2022 17:26)  HR: 61 (25 Jun 2022 17:26) (58 - 77)  BP: 163/100 (25 Jun 2022 17:26) (162/88 - 191/107)  BP(mean): --  RR: 18 (25 Jun 2022 17:26) (18 - 19)  SpO2: 100% (25 Jun 2022 17:26) (97% - 100%) Daily     Daily   CAPILLARY BLOOD GLUCOSE        I&O's Summary      HISTORY:  PAST MEDICAL & SURGICAL HISTORY:  HTN (hypertension)      Allergies    No Known Allergies    Intolerances       LABS:                        12.2   4.01  )-----------( 172      ( 25 Jun 2022 10:45 )             36.6       Culture - Urine (collected 23 Jun 2022 17:05)  Source: Clean Catch Clean Catch (Midstream)  Final Report (24 Jun 2022 22:53):    <10,000 CFU/mL Normal Urogenital Natalee    06-25    144  |  109<H>  |  13  ----------------------------<  117<H>  3.8   |  30  |  1.28    Ca    8.6      25 Jun 2022 10:45                Culture - Urine (collected 23 Jun 2022 17:05)  Source: Clean Catch Clean Catch (Midstream)  Final Report (24 Jun 2022 22:53):    <10,000 CFU/mL Normal Urogenital Natalee          MEDICATIONS:  MEDICATIONS  (STANDING):  amLODIPine   Tablet 5 milliGRAM(s) Oral daily  enoxaparin Injectable 40 milliGRAM(s) SubCutaneous every 24 hours    MEDICATIONS  (PRN):  
Patient is a 51y old  Male who presents with a chief complaint of Homeless, found on street. (2022 18:15)      INTERVAL HPI/OVERNIGHT EVENTS:  Pt was seen and examined, no acute  events.      MEDICATIONS  (STANDING):  enoxaparin Injectable 40 milliGRAM(s) SubCutaneous every 24 hours    MEDICATIONS  (PRN):      Allergies  No Known Allergies        Vital Signs Last 24 Hrs  T(C): 36.7 (2022 17:28), Max: 36.9 (2022 00:31)  T(F): 98.1 (2022 17:28), Max: 98.4 (2022 00:31)  HR: 77 (2022 17:28) (66 - 78)  BP: 164/99 (2022 17:28) (138/85 - 166/78)  BP(mean): --  RR: 18 (2022 17:28) (18 - 18)  SpO2: 96% (2022 17:28) (96% - 97%)      PHYSICAL EXAM:  GENERAL: NAD  HEAD:  Atraumatic  EYES: PERRLA  NERVOUS SYSTEM:  Awake, alert  CHEST/LUNG: Clear  HEART: RRR, S1, S2  ABDOMEN: Soft, non tender  EXTREMITIES:  no edema      LABS:                        12.0   4.52  )-----------( 179      ( 2022 05:34 )             36.7     06-23    146<H>  |  114<H>  |  18  ----------------------------<  131<H>  3.9   |  31  |  1.27    Ca    9.2      2022 12:10  Mg     2.4     -    TPro  6.9  /  Alb  3.3  /  TBili  0.3  /  DBili  x   /  AST  24  /  ALT  24  /  AlkPhos  59  06-23      Urinalysis Basic - ( 2022 17:05 )    Color: Yellow / Appearance: Clear / S.010 / pH: x  Gluc: x / Ketone: Negative  / Bili: Negative / Urobili: Negative mg/dL   Blood: x / Protein: Negative mg/dL / Nitrite: Negative   Leuk Esterase: Negative / RBC: x / WBC x   Sq Epi: x / Non Sq Epi: x / Bacteria: x      CAPILLARY BLOOD GLUCOSE          RADIOLOGY & ADDITIONAL TESTS:    Imaging Personally Reviewed:  [ ] YES  [ ] NO    Consultant(s) Notes Reviewed:  [ ] YES  [ ] NO    Care Discussed with Consultants/Other Providers [ ] YES  [ ] NO
                          Patient: EMIR KHAN [id number] 51y Male                            Hospitalist Attending Note    No complaints.     ____________________PHYSICAL EXAM:  GENERAL:  NAD Alert and Oriented x 3   HEENT: NCAT  CARDIOVASCULAR:  S1, S2  LUNGS: CTAB  ABDOMEN:  soft, (-) tenderness, (-) distension, (+) bowel sounds, (-) guarding, (-) rebound (-) rigidity  EXTREMITIES:  no cyanosis / clubbing / edema.   ____________________    VITALS:  Vital Signs Last 24 Hrs  T(C): 36.9 (27 Jun 2022 11:41), Max: 37 (26 Jun 2022 17:14)  T(F): 98.5 (27 Jun 2022 11:41), Max: 98.6 (26 Jun 2022 17:14)  HR: 78 (27 Jun 2022 11:41) (59 - 78)  BP: 130/77 (27 Jun 2022 11:41) (130/77 - 155/83)  BP(mean): --  RR: 18 (27 Jun 2022 11:41) (18 - 18)  SpO2: 97% (27 Jun 2022 11:41) (95% - 100%) Daily     Daily   CAPILLARY BLOOD GLUCOSE        I&O's Summary      LABS:    06-26    145  |  112<H>  |  13  ----------------------------<  91  3.6   |  25  |  1.11    Ca    9.1      26 Jun 2022 09:59                    MEDICATIONS:  amLODIPine   Tablet 10 milliGRAM(s) Oral daily  enoxaparin Injectable 40 milliGRAM(s) SubCutaneous every 24 hours  lisinopril 5 milliGRAM(s) Oral daily    
                          Patient: EMIR KHAN [id number] 51y Male                            Hospitalist Attending Note    No complaints.     ____________________PHYSICAL EXAM:  GENERAL:  NAD Alert and Oriented x 3   HEENT: NCAT  CARDIOVASCULAR:  S1, S2  LUNGS: CTAB  ABDOMEN:  soft, (-) tenderness, (-) distension, (+) bowel sounds, (-) guarding, (-) rebound (-) rigidity  EXTREMITIES:  no cyanosis / clubbing / edema.   ____________________    VITALS:  Vital Signs Last 24 Hrs  T(C): 36.7 (26 Jun 2022 11:37), Max: 37.1 (25 Jun 2022 23:48)  T(F): 98 (26 Jun 2022 11:37), Max: 98.8 (25 Jun 2022 23:48)  HR: 80 (26 Jun 2022 11:37) (61 - 80)  BP: 165/92 (26 Jun 2022 11:37) (158/92 - 165/92)  BP(mean): --  RR: 18 (26 Jun 2022 11:37) (18 - 18)  SpO2: 97% (26 Jun 2022 11:37) (97% - 100%) Daily     Daily   CAPILLARY BLOOD GLUCOSE        I&O's Summary      LABS:                        12.2   4.01  )-----------( 172      ( 25 Jun 2022 10:45 )             36.6     06-26    145  |  112<H>  |  13  ----------------------------<  91  3.6   |  25  |  1.11    Ca    9.1      26 Jun 2022 09:59                  Culture - Urine (collected 23 Jun 2022 17:05)  Source: Clean Catch Clean Catch (Midstream)  Final Report (24 Jun 2022 22:53):    <10,000 CFU/mL Normal Urogenital Natalee        MEDICATIONS:  amLODIPine   Tablet 10 milliGRAM(s) Oral daily  enoxaparin Injectable 40 milliGRAM(s) SubCutaneous every 24 hours    
Patient is a 51y old  Male who presents with a chief complaint of Homeless, found on street. (28 Jun 2022 10:48)    INTERVAL HPI/OVERNIGHT EVENTS: Patients seen and examined at bedside this morning. No acute events overnight. Pt reports he's doing okay. Denies difficulty with appetite.     MEDICATIONS  (STANDING):  amLODIPine   Tablet 10 milliGRAM(s) Oral daily  enoxaparin Injectable 40 milliGRAM(s) SubCutaneous every 24 hours  lisinopril 5 milliGRAM(s) Oral daily    MEDICATIONS  (PRN):    Allergies    No Known Allergies    Intolerances      REVIEW OF SYSTEMS:  All other systems reviewed and are negative    Vital Signs Last 24 Hrs  T(C): 36.6 (29 Jun 2022 11:59), Max: 36.6 (29 Jun 2022 05:28)  T(F): 97.9 (29 Jun 2022 11:59), Max: 97.9 (29 Jun 2022 11:59)  HR: 64 (29 Jun 2022 11:59) (56 - 64)  BP: 132/78 (29 Jun 2022 11:59) (132/78 - 156/97)  BP(mean): --  RR: 18 (29 Jun 2022 11:59) (18 - 18)  SpO2: 97% (29 Jun 2022 11:59) (97% - 99%)  Daily     Daily   I&O's Summary    28 Jun 2022 07:01  -  29 Jun 2022 07:00  --------------------------------------------------------  IN: 930 mL / OUT: 300 mL / NET: 630 mL      CAPILLARY BLOOD GLUCOSE        PHYSICAL EXAM:  GENERAL: NAD, well-groomed, well-developed  HEAD:  Atraumatic, Normocephalic  EYES: EOMI, PERRLA, conjunctiva and sclera clear  ENMT: No tonsillar erythema, exudates, or enlargement; Moist mucous membranes, Good dentition, No lesions  NECK: Supple, No JVD, Normal thyroid  NERVOUS SYSTEM:  Alert & Oriented X3, Good concentration; Motor Strength 5/5 B/L upper and lower extremities; DTRs 2+ intact and symmetric  CHEST/LUNG: Clear to percussion bilaterally; No rales, rhonchi, wheezing, or rubs  HEART: Regular rate and rhythm; No murmurs, rubs, or gallops  ABDOMEN: Soft, Nontender, Nondistended; Bowel sounds present  EXTREMITIES:  2+ Peripheral Pulses, No clubbing, cyanosis, or edema  LYMPH: No lymphadenopathy noted  SKIN: No rashes or lesions    Labs                          12.3   4.44  )-----------( 183      ( 28 Jun 2022 06:49 )             37.2     06-28    142  |  109<H>  |  16  ----------------------------<  87  3.7   |  30  |  1.13    Ca    8.8      28 Jun 2022 06:49

## 2022-06-30 VITALS
OXYGEN SATURATION: 97 % | DIASTOLIC BLOOD PRESSURE: 83 MMHG | HEART RATE: 75 BPM | RESPIRATION RATE: 18 BRPM | SYSTOLIC BLOOD PRESSURE: 129 MMHG | TEMPERATURE: 98 F

## 2022-06-30 PROCEDURE — 99239 HOSP IP/OBS DSCHRG MGMT >30: CPT

## 2022-06-30 RX ORDER — AMLODIPINE BESYLATE 2.5 MG/1
1 TABLET ORAL
Qty: 30 | Refills: 0
Start: 2022-06-30 | End: 2022-07-29

## 2022-06-30 RX ORDER — LISINOPRIL 2.5 MG/1
1 TABLET ORAL
Qty: 30 | Refills: 0
Start: 2022-06-30 | End: 2022-07-29

## 2022-06-30 RX ADMIN — LISINOPRIL 5 MILLIGRAM(S): 2.5 TABLET ORAL at 05:25

## 2022-06-30 RX ADMIN — ENOXAPARIN SODIUM 40 MILLIGRAM(S): 100 INJECTION SUBCUTANEOUS at 05:25

## 2022-06-30 RX ADMIN — AMLODIPINE BESYLATE 10 MILLIGRAM(S): 2.5 TABLET ORAL at 05:25

## 2022-06-30 NOTE — DISCHARGE NOTE NURSING/CASE MANAGEMENT/SOCIAL WORK - PATIENT PORTAL LINK FT
You can access the FollowMyHealth Patient Portal offered by [facility] by registering at the following website: [email or web address]. By joining "[employer]’s FollowMyHealth portal, you will also be able to view your health information using other applications (apps) compatible with our system.

## 2022-07-06 DIAGNOSIS — Z59.00 HOMELESSNESS UNSPECIFIED: ICD-10-CM

## 2022-07-06 DIAGNOSIS — R41.82 ALTERED MENTAL STATUS, UNSPECIFIED: ICD-10-CM

## 2022-07-06 DIAGNOSIS — R22.41 LOCALIZED SWELLING, MASS AND LUMP, RIGHT LOWER LIMB: ICD-10-CM

## 2022-07-06 DIAGNOSIS — I10 ESSENTIAL (PRIMARY) HYPERTENSION: ICD-10-CM

## 2022-07-06 DIAGNOSIS — E87.0 HYPEROSMOLALITY AND HYPERNATREMIA: ICD-10-CM

## 2022-07-06 DIAGNOSIS — G93.41 METABOLIC ENCEPHALOPATHY: ICD-10-CM

## 2022-07-06 DIAGNOSIS — B88.8 OTHER SPECIFIED INFESTATIONS: ICD-10-CM

## 2022-07-06 SDOH — ECONOMIC STABILITY - HOUSING INSECURITY: HOMELESSNESS UNSPECIFIED: Z59.00
